# Patient Record
Sex: MALE | Race: WHITE | Employment: UNEMPLOYED | URBAN - NONMETROPOLITAN AREA
[De-identification: names, ages, dates, MRNs, and addresses within clinical notes are randomized per-mention and may not be internally consistent; named-entity substitution may affect disease eponyms.]

---

## 2020-01-01 ENCOUNTER — HOSPITAL ENCOUNTER (EMERGENCY)
Age: 65
End: 2020-12-13
Attending: EMERGENCY MEDICINE
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
## 2020-01-01 PROCEDURE — 99282 EMERGENCY DEPT VISIT SF MDM: CPT

## 2020-12-13 NOTE — ED NOTES
Bryan from [facility] returned call and states they will be holding patient for eye donation but will release for Tissue donation.
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
## 2020-12-13 NOTE — ED NOTES
Wife Dustin Cancel in to see patient. Given belongings that were on patient to wife to include: 1 [employer] Debit card, 1 [employer] Essential card, [address], [address], 1 [employer], 1 Key ring with 3 key fobs and 8 keys, 1 leather money ruby, 2 $20 bills, 1 $5 bill, and 1 $1 dollar bill. Wife took 2 rings (1 signet ring initials [address] from left pinky and 1 wedding band from left ring finger, 1 black bracelet removed from right wrist. Hair bands in beard x2. Pt still has shirt that is cut. Jeans, long johns, socks and 1 shoe to left foot on. Abrasions to left hand, left forearm, left wrist, right thumb, bleeding noted to back of head, pt has been on backboard with straps since arrival, abrasion to left shin, IO access with IVF's, bleeding on right ear.
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
## 2020-12-14 NOTE — ED NOTES
Report taken from Cedrick Pedroza RN. Waiting for [facility] office to arrive to take remains.
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
